# Patient Record
Sex: FEMALE | Race: WHITE
[De-identification: names, ages, dates, MRNs, and addresses within clinical notes are randomized per-mention and may not be internally consistent; named-entity substitution may affect disease eponyms.]

---

## 2019-03-19 ENCOUNTER — HOSPITAL ENCOUNTER (INPATIENT)
Dept: HOSPITAL 91 - OBT | Age: 34
LOS: 2 days | Discharge: HOME | End: 2019-03-21
Payer: MEDICAID

## 2019-03-19 ENCOUNTER — HOSPITAL ENCOUNTER (INPATIENT)
Dept: HOSPITAL 10 - OBT | Age: 34
LOS: 2 days | Discharge: HOME | End: 2019-03-21
Attending: OBSTETRICS & GYNECOLOGY | Admitting: OBSTETRICS & GYNECOLOGY
Payer: MEDICAID

## 2019-03-19 VITALS
BODY MASS INDEX: 28.4 KG/M2 | WEIGHT: 160.28 LBS | BODY MASS INDEX: 28.4 KG/M2 | WEIGHT: 160.28 LBS | HEIGHT: 63 IN | HEIGHT: 63 IN

## 2019-03-19 VITALS — HEART RATE: 74 BPM | RESPIRATION RATE: 18 BRPM | DIASTOLIC BLOOD PRESSURE: 60 MMHG | SYSTOLIC BLOOD PRESSURE: 100 MMHG

## 2019-03-19 VITALS — SYSTOLIC BLOOD PRESSURE: 100 MMHG | RESPIRATION RATE: 16 BRPM | HEART RATE: 85 BPM | DIASTOLIC BLOOD PRESSURE: 62 MMHG

## 2019-03-19 VITALS — SYSTOLIC BLOOD PRESSURE: 104 MMHG | RESPIRATION RATE: 18 BRPM | HEART RATE: 82 BPM | DIASTOLIC BLOOD PRESSURE: 59 MMHG

## 2019-03-19 VITALS — DIASTOLIC BLOOD PRESSURE: 60 MMHG | RESPIRATION RATE: 19 BRPM | HEART RATE: 63 BPM | SYSTOLIC BLOOD PRESSURE: 100 MMHG

## 2019-03-19 DIAGNOSIS — Z3A.39: ICD-10-CM

## 2019-03-19 LAB
ADD MAN DIFF?: NO
BASOPHIL #: 0.1 10^3/UL (ref 0–0.1)
BASOPHILS %: 0.7 % (ref 0–2)
EOSINOPHILS #: 0.1 10^3/UL (ref 0–0.5)
EOSINOPHILS %: 1.4 % (ref 0–7)
HEMATOCRIT: 35.5 % (ref 37–47)
HEMOGLOBIN: 11.8 G/DL (ref 12–16)
HEPATITIS B SURFACE ANTIGEN: NEGATIVE
IMMATURE GRANS #M: 0.15 10^3/UL (ref 0–0.03)
IMMATURE GRANS % (M): 1.5 % (ref 0–0.43)
INR: 0.89
LYMPHOCYTES #: 2.3 10^3/UL (ref 0.8–2.9)
LYMPHOCYTES %: 23.8 % (ref 15–51)
MEAN CORPUSCULAR HEMOGLOBIN: 27.7 PG (ref 29–33)
MEAN CORPUSCULAR HGB CONC: 33.2 G/DL (ref 32–37)
MEAN CORPUSCULAR VOLUME: 83.3 FL (ref 82–101)
MEAN PLATELET VOLUME: 9.5 FL (ref 7.4–10.4)
MONOCYTE #: 1.5 10^3/UL (ref 0.3–0.9)
MONOCYTES %: 14.9 % (ref 0–11)
NEUTROPHIL #: 5.6 10^3/UL (ref 1.6–7.5)
NEUTROPHILS %: 57.7 % (ref 39–77)
NUCLEATED RED BLOOD CELLS #: 0 10^3/UL (ref 0–0)
NUCLEATED RED BLOOD CELLS%: 0 /100WBC (ref 0–0)
PARTIAL THROMBOPLASTIN TIME: 27.2 SEC (ref 23–35)
PLATELET COUNT: 347 10^3/UL (ref 140–415)
PROTIME: 12.2 SEC (ref 11.9–14.9)
PT RATIO: 1
RAPID PLASMA REAGIN: NONREACTIVE
RED BLOOD COUNT: 4.26 10^6/UL (ref 4.2–5.4)
RED CELL DISTRIBUTION WIDTH: 14.8 % (ref 11.5–14.5)
WHITE BLOOD COUNT: 9.8 10^3/UL (ref 4.8–10.8)

## 2019-03-19 PROCEDURE — 86900 BLOOD TYPING SEROLOGIC ABO: CPT

## 2019-03-19 PROCEDURE — 86901 BLOOD TYPING SEROLOGIC RH(D): CPT

## 2019-03-19 PROCEDURE — 3E033VJ INTRODUCTION OF OTHER HORMONE INTO PERIPHERAL VEIN, PERCUTANEOUS APPROACH: ICD-10-PCS | Performed by: OBSTETRICS & GYNECOLOGY

## 2019-03-19 PROCEDURE — 86592 SYPHILIS TEST NON-TREP QUAL: CPT

## 2019-03-19 PROCEDURE — 87340 HEPATITIS B SURFACE AG IA: CPT

## 2019-03-19 PROCEDURE — 86850 RBC ANTIBODY SCREEN: CPT

## 2019-03-19 PROCEDURE — 85730 THROMBOPLASTIN TIME PARTIAL: CPT

## 2019-03-19 PROCEDURE — G0463 HOSPITAL OUTPT CLINIC VISIT: HCPCS

## 2019-03-19 PROCEDURE — 85610 PROTHROMBIN TIME: CPT

## 2019-03-19 PROCEDURE — 90686 IIV4 VACC NO PRSV 0.5 ML IM: CPT

## 2019-03-19 PROCEDURE — 85025 COMPLETE CBC W/AUTO DIFF WBC: CPT

## 2019-03-19 PROCEDURE — 3E033VJ INTRODUCTION OF OTHER HORMONE INTO PERIPHERAL VEIN, PERCUTANEOUS APPROACH: ICD-10-PCS

## 2019-03-19 RX ADMIN — PYRIDOXINE HYDROCHLORIDE SCH MLS/HR: 100 INJECTION, SOLUTION INTRAMUSCULAR; INTRAVENOUS at 05:52

## 2019-03-19 RX ADMIN — Medication 1 APPLIC: at 11:47

## 2019-03-19 RX ADMIN — IBUPROFEN 1 MG: 600 TABLET ORAL at 11:48

## 2019-03-19 RX ADMIN — IBUPROFEN 1 MG: 600 TABLET ORAL at 17:54

## 2019-03-19 RX ADMIN — Medication 1 MLS/HR: at 11:50

## 2019-03-19 RX ADMIN — PYRIDOXINE HYDROCHLORIDE SCH MLS/HR: 100 INJECTION, SOLUTION INTRAMUSCULAR; INTRAVENOUS at 15:30

## 2019-03-19 RX ADMIN — PYRIDOXINE HYDROCHLORIDE SCH MLS/HR: 100 INJECTION, SOLUTION INTRAMUSCULAR; INTRAVENOUS at 21:52

## 2019-03-19 RX ADMIN — Medication SCH MLS/HR: at 05:52

## 2019-03-19 RX ADMIN — Medication 1 MLS/HR: at 05:52

## 2019-03-19 RX ADMIN — IBUPROFEN SCH MG: 600 TABLET ORAL at 11:48

## 2019-03-19 RX ADMIN — IBUPROFEN SCH MG: 600 TABLET ORAL at 06:13

## 2019-03-19 RX ADMIN — Medication SCH MLS/HR: at 11:50

## 2019-03-19 RX ADMIN — Medication PRN APPLIC: at 11:47

## 2019-03-19 RX ADMIN — Medication 1 MLS/HR: at 05:45

## 2019-03-19 RX ADMIN — Medication 1 MLS/HR: at 05:43

## 2019-03-19 RX ADMIN — HYDROCODONE BITARTRATE AND ACETAMINOPHEN 1 TAB: 5; 325 TABLET ORAL at 08:30

## 2019-03-19 RX ADMIN — IBUPROFEN 1 MG: 600 TABLET ORAL at 06:13

## 2019-03-19 RX ADMIN — PYRIDOXINE HYDROCHLORIDE 1 MLS/HR: 100 INJECTION, SOLUTION INTRAMUSCULAR; INTRAVENOUS at 02:47

## 2019-03-19 RX ADMIN — AMPICILLIN 1 MLS/HR: 2 INJECTION, POWDER, FOR SOLUTION INTRAVENOUS at 02:48

## 2019-03-19 RX ADMIN — PYRIDOXINE HYDROCHLORIDE 1 MLS/HR: 100 INJECTION, SOLUTION INTRAMUSCULAR; INTRAVENOUS at 15:30

## 2019-03-19 RX ADMIN — PYRIDOXINE HYDROCHLORIDE 1 MLS/HR: 100 INJECTION, SOLUTION INTRAMUSCULAR; INTRAVENOUS at 05:52

## 2019-03-19 RX ADMIN — PYRIDOXINE HYDROCHLORIDE 1 MLS/HR: 100 INJECTION, SOLUTION INTRAMUSCULAR; INTRAVENOUS at 21:52

## 2019-03-19 RX ADMIN — IBUPROFEN SCH MG: 600 TABLET ORAL at 17:54

## 2019-03-19 NOTE — LDN
Date/Time of Note


Date/Time of Note


DATE: 3/19/19 


TIME: 05:44





Delivery Summary


 of a viable baby boy weighinbg 3715 grams or 8# 3 oz, 19.5" long, and with 


Apgars of 8/9.


Weeks of Gestation


39w 4d


Placenta Delivered:  Spontaneously


Meconium:  none


Episiotomy:  No


Perineal laceration:  0


Estimated blood loss:  200


Sponge & Needle done & correct:  Yes


All needle counts correct:  Yes


Any foreign bodies felt in the:  No (vagina)





Infant Delivery Information


Sex


Infant Sex:  male





Apgars


1 Minute:  8


5 Minute:  9





Suctioning


Nose & mouth suctioned at leandro:  Yes


Delee suction performed:  No





Umbilical Cord


Umbilical cord with:  3 Vessels


Cord presentations:  nuchal cord


Nuchal cord present X:  1


Cord Blood was obtained:  Yes





Mother & Baby Disposition


Disposition


Mom & Baby to Maternity; Good:  Yes


Baby to NICU:  No











JESSICA RICHMOND MD             Mar 19, 2019 05:46

## 2019-03-19 NOTE — HP
Date/Time of Note


Date/Time of Note


DATE: 3/19/19 


TIME: 05:47





OB - History


Hx of Present Pregnancy


Free Text/Dictation


33 y.o.  with an IUP at 39w 3d came in active labor.


Estimated Due Date:  Mar 22, 2019


:  3


Para:  2


Prenatal Care:  Good Care


Ultrasounds:  Normal mid trimester US


Obstetrical Complications:  None


Medical Complications:  None


Other Pregnancy Concerns:


PMHx: none.


PSHx: none.


NKDA





Past Family/Social History


*


Past Medical, Surgical, Family and Obstetric Histories reviewed with pt, not 


from prenatal records as they were not available.


Blood Type:  O+


Rubella:  immune


RPR/VDRL:  Negative


GBS Status:  Negative


HBsAG:  Negative





OB  Admission Exam


Vital Signs


Vital Signs


T=98.2  /70





Physical Exam


HEENT:  WNL


Lungs:  Clear


Abdomen:  WNL


Extremities:  Normal


Reflexes:  Normal


Cervical Dilatation:  3cm


Effacement:  75%


Station:  -3


Membranes:  Intact


Fetal Heart Rate:  130's


Accelerations:  Accelerations Present


Decelerations:  No Decelerations


Varibility:  Moderate


Contractions on Admission:  < 5 Minutes Apart


Intensity:  Moderate


Last 72 hours Lab Results


                                    CBC & BMP


3/19/19 02:30











OB  Assessment/Plan


Reason for admission:  active labor


Plan:  Expectant Management











JESSICA RICHMOND MD             Mar 19, 2019 05:51

## 2019-03-19 NOTE — TRIAGE
===================================

OB Triage

===================================

Datetime Report Generated by CPN: 03/19/2019 03:24

   

   

===========================

Datetime: 03/19/2019 03:00

===========================

   

 Stage of Pregnancy:  Labor

   

===================================

Labor Evaluation

===================================

   

 Frequency:  3-5

 Monitor Mode:  External

 Duration (sec)2399:  

 Quality:  Strong

 Pattern:  Normal: <= 5 Contractions in 10 Minutes

 Resting Tone [place]:  Relaxed

   

===================================

Fetal Heart Rate

===================================

   

 FHR Baseline Rate:  130

 Monitor Mode:  External US

 Variability:  Moderate 6-25 bpm

 Accelerations:  None

 Decelerations:  None

   

===================================

Vaginal Exam

===================================

   

 Dilatation (cms):  8.5

 Effacement (%):  90

 Station:  -2

 Exam By:  JLEAL

 Cervix, Consistency:  Soft

 Cervix, Position:  Anterior

   

===========================

Datetime: 03/19/2019 02:36

===========================

   

 Assessment Type:  Admission Assessment

 Time of Arrival:  03/19/2019 02:10

 EGA:  39.4

 Arrived By:  Wheelchair

 Arrived From:  Home

 Chief Complaint:  uc's since 22:00

 Fetal Movement:  Present

 Contractions:  Regular

 Contractions:  Q5MIN

 Rupture of Membranes:  Denies

 Vaginal Bleeding:  None

 Vaginal Discharge:  Denies

 Recent Sexual Intercouse:  Denies

 Abdominal Trauma:  Not Applicable

 Patient Complaints:  Contractions

 Time Provider Notified:  03/19/2019 02:52

 Provider Notified:  YI

 Initial Plan:  EFM, SVE CALL OB

   

===================================

Maternal Assessment

===================================

   

 Level of Consciousness:  Fully Conscious

 DTR's/Clonus:  DTRs 2+; No Clonus

 Headache:  Denies

 Blurred Vision:  No

 Respiratory Effort:  Unlabored; Regular Rhythm; Equal Expansion

 Breath Sounds, Left:  Clear and Equal

 Breath Sounds, Right:  Clear and Equal

 Nausea/Vomiting:  Denies

 RUQ Epigastric Pain:  Denies

 Lower Extremities Edema:  None

     Degree:  None

 Upper Extremities Edema:  None

     Degree:  None

 Facial Edema:  None

   

===================================

Fall Risk Assessment

===================================

   

 History of Falling:  (0) No

 Secondary Diagnosis:  (0) No

 Ambulatory Aid:  (0) Bedrest/Nurse Assist

 IV Therapy:  (0) No

 Gait:  (0) Normal/Bedrest/Immobile

 Mental Status:  (0) Oriented to Own Ability

 Fall Score:  0

 Fall Risk Score Definition:  No Risk: No action required

   

===================================

Labor Evaluation

===================================

   

 Frequency:  5

 Duration (sec)2399:  80

 Quality:  Strong

 Pattern:  Normal: <= 5 Contractions in 10 Minutes

 Resting Tone [place]:  Relaxed

   

===================================

Fetal Heart Rate

===================================

   

 FHR Baseline Rate:  135

 Variability:  Moderate 6-25 bpm

 Accelerations:  15X15

 Decelerations:  None

 Category:  Category I

   

===================================

Pain Assessment

===================================

   

 Pain Scale:  9

 Pain Presence:  Intermittent

 Pain Type:  Contraction

 Membrane Status:  Intact

   

===========================

Datetime: 03/19/2019 02:34

===========================

   

 Time of Arrival:  03/19/2019 02:34

 EGA:  39.4

 Arrived By:  Wheelchair

   

===========================

Datetime: 03/19/2019 02:32

===========================

   

 Stage of Pregnancy:  Labor

   

===========================

Datetime: 03/19/2019 02:27

===========================

   

   

===================================

Labor Evaluation

===================================

   

 Frequency:  5

 Monitor Mode:  External

 Duration (sec)2399:  70-90

 Pattern:  Normal: <= 5 Contractions in 10 Minutes

   

===================================

Fetal Heart Rate

===================================

   

 FHR Baseline Rate:  130

 Monitor Mode:  External US

 FHR Baseline Changes:  No Baseline Change

 Variability:  Moderate 6-25 bpm

 Accelerations:  None

   

===========================

Datetime: 03/19/2019 02:20

===========================

   

   

===================================

Vaginal Exam

===================================

   

 Dilatation (cms):  7.0

 Effacement (%):  80

 Station:  -2

 Exam By:  GAEL RN

 Membrane Status:  Intact

 Vaginal Bleeding:  None

 Cervix, Consistency:  Soft

 Cervix, Position:  Posterior

 Fetal Presentation 'A':  Cephalic

   

===========================

Datetime: 03/19/2019 02:17

===========================

   

 Stage of Pregnancy:  OB Triage

 Assessment Type:  Triage

   

===================================

Maternal Assessment

===================================

   

 Level of Consciousness:  Fully Conscious

 Headache:  Denies

 Blurred Vision:  No

 Respiratory Effort:  Unlabored; Regular Rhythm; Equal Expansion

 Breath Sounds, Left:  Clear and Equal

 Breath Sounds, Right:  Clear and Equal

 Nausea/Vomiting:  Denies

 RUQ Epigastric Pain:  Denies

 Facial Edema:  None

 Temperature Route:  Oral

   

===================================

Fall Risk Assessment

===================================

   

 History of Falling:  (0) No

 Secondary Diagnosis:  (0) No

 Ambulatory Aid:  (0) Bedrest/Nurse Assist

 IV Therapy:  (0) No

 Gait:  (0) Normal/Bedrest/Immobile

 Mental Status:  (0) Oriented to Own Ability

 Fall Score:  0

 Fall Risk Score Definition:  No Risk: No action required

## 2019-03-20 VITALS — RESPIRATION RATE: 20 BRPM | DIASTOLIC BLOOD PRESSURE: 58 MMHG | SYSTOLIC BLOOD PRESSURE: 101 MMHG | HEART RATE: 85 BPM

## 2019-03-20 VITALS — RESPIRATION RATE: 18 BRPM | HEART RATE: 79 BPM | DIASTOLIC BLOOD PRESSURE: 57 MMHG | SYSTOLIC BLOOD PRESSURE: 100 MMHG

## 2019-03-20 VITALS — DIASTOLIC BLOOD PRESSURE: 66 MMHG | HEART RATE: 81 BPM | SYSTOLIC BLOOD PRESSURE: 96 MMHG | RESPIRATION RATE: 20 BRPM

## 2019-03-20 LAB
ADD MAN DIFF?: NO
BASOPHIL #: 0.1 10^3/UL (ref 0–0.1)
BASOPHILS %: 0.6 % (ref 0–2)
EOSINOPHILS #: 0.1 10^3/UL (ref 0–0.5)
EOSINOPHILS %: 1 % (ref 0–7)
HEMATOCRIT: 36.6 % (ref 37–47)
HEMOGLOBIN: 11.8 G/DL (ref 12–16)
IMMATURE GRANS #M: 0.08 10^3/UL (ref 0–0.03)
IMMATURE GRANS % (M): 0.6 % (ref 0–0.43)
LYMPHOCYTES #: 3.5 10^3/UL (ref 0.8–2.9)
LYMPHOCYTES %: 25.4 % (ref 15–51)
MEAN CORPUSCULAR HEMOGLOBIN: 26.9 PG (ref 29–33)
MEAN CORPUSCULAR HGB CONC: 32.2 G/DL (ref 32–37)
MEAN CORPUSCULAR VOLUME: 83.6 FL (ref 82–101)
MEAN PLATELET VOLUME: 9.6 FL (ref 7.4–10.4)
MONOCYTE #: 1.2 10^3/UL (ref 0.3–0.9)
MONOCYTES %: 8.5 % (ref 0–11)
NEUTROPHIL #: 8.9 10^3/UL (ref 1.6–7.5)
NEUTROPHILS %: 63.9 % (ref 39–77)
NUCLEATED RED BLOOD CELLS #: 0 10^3/UL (ref 0–0)
NUCLEATED RED BLOOD CELLS%: 0 /100WBC (ref 0–0)
PLATELET COUNT: 366 10^3/UL (ref 140–415)
RED BLOOD COUNT: 4.38 10^6/UL (ref 4.2–5.4)
RED CELL DISTRIBUTION WIDTH: 15.2 % (ref 11.5–14.5)
WHITE BLOOD COUNT: 14 10^3/UL (ref 4.8–10.8)

## 2019-03-20 RX ADMIN — PYRIDOXINE HYDROCHLORIDE 1 MLS/HR: 100 INJECTION, SOLUTION INTRAMUSCULAR; INTRAVENOUS at 21:52

## 2019-03-20 RX ADMIN — IBUPROFEN SCH MG: 600 TABLET ORAL at 00:27

## 2019-03-20 RX ADMIN — IBUPROFEN 1 MG: 600 TABLET ORAL at 23:47

## 2019-03-20 RX ADMIN — IBUPROFEN 1 MG: 600 TABLET ORAL at 18:18

## 2019-03-20 RX ADMIN — PYRIDOXINE HYDROCHLORIDE SCH MLS/HR: 100 INJECTION, SOLUTION INTRAMUSCULAR; INTRAVENOUS at 13:52

## 2019-03-20 RX ADMIN — PYRIDOXINE HYDROCHLORIDE SCH MLS/HR: 100 INJECTION, SOLUTION INTRAMUSCULAR; INTRAVENOUS at 21:52

## 2019-03-20 RX ADMIN — IBUPROFEN SCH MG: 600 TABLET ORAL at 05:54

## 2019-03-20 RX ADMIN — PYRIDOXINE HYDROCHLORIDE SCH MLS/HR: 100 INJECTION, SOLUTION INTRAMUSCULAR; INTRAVENOUS at 05:52

## 2019-03-20 RX ADMIN — PYRIDOXINE HYDROCHLORIDE 1 MLS/HR: 100 INJECTION, SOLUTION INTRAMUSCULAR; INTRAVENOUS at 13:52

## 2019-03-20 RX ADMIN — IBUPROFEN SCH MG: 600 TABLET ORAL at 23:47

## 2019-03-20 RX ADMIN — IBUPROFEN SCH MG: 600 TABLET ORAL at 12:20

## 2019-03-20 RX ADMIN — IBUPROFEN 1 MG: 600 TABLET ORAL at 00:27

## 2019-03-20 RX ADMIN — IBUPROFEN SCH MG: 600 TABLET ORAL at 18:18

## 2019-03-20 RX ADMIN — PYRIDOXINE HYDROCHLORIDE 1 MLS/HR: 100 INJECTION, SOLUTION INTRAMUSCULAR; INTRAVENOUS at 05:52

## 2019-03-20 RX ADMIN — INFLUENZA A VIRUS A/MICHIGAN/45/2015 X-275 (H1N1) ANTIGEN (FORMALDEHYDE INACTIVATED), INFLUENZA A VIRUS A/HONG KONG/4801/2014 X-263B (H3N2) ANTIGEN (FORMALDEHYDE INACTIVATED), INFLUENZA B VIRUS B/PHUKET/3073/2013 ANTIGEN (FORMALDEHYDE INACTIVATED), AND INFLUENZA B VIRUS B/BRISBANE/60/2008 ANTIGEN (FORMALDEHYDE INACTIVATED) 1 ML: 15; 15; 15; 15 INJECTION, SUSPENSION INTRAMUSCULAR at 10:00

## 2019-03-20 RX ADMIN — IBUPROFEN 1 MG: 600 TABLET ORAL at 12:20

## 2019-03-20 RX ADMIN — IBUPROFEN 1 MG: 600 TABLET ORAL at 05:54

## 2019-03-20 NOTE — DS
Date/Time of Note


Date/Time of Note


DATE: 3/20/19 


TIME: 21:00





Obstetrical Discharge Record


Final Diagnosis


Final Diagnosis:  Term delivered


Other Final Diagnosis





33-year-old -0-0-3 s/p normal vaginal delivery at 39 weeks and 3 days. 


PPD#1.  Her postpartum course was unremarkable.  She is ambulating and 


tolerating regular rate and regular diet. She is voiding without difficulty.  


The pain is controlled on current medication


-  AF, VSS


-  Contraception methods with R/B/A/FR discussed


-  Discharge home tomorrow


-  Rx and instruction given


-  Follow up in 2 and 6 weeks at clinic





Vaginal Delivery


Obstetrical Delivery:  Spontaneous





Condition on Discharge


Physical Assessment


Voiding:  Yes


Bowel Movement:  Yes


Breast:  Soft, non-tender


Fundus:  Firm


Calf Tenderness:  No


Patient Condition:  Stable











HUBERT BROWNE                  Mar 20, 2019 21:02

## 2019-03-20 NOTE — PN
Date/Time of Note


Date/Time of Note


DATE: 3/20/19 


TIME: 20:58





OB Subjective


Subjective


Subjective


PPD# 1





Patient is doing well. She denies nausea, vomiting, shortness of breath, chest 


pain, headache. She has been ambulating without difficulty, tolerating regular 


diet. Pain is well controlled on current medications





OB Objective


Objective


Objective





Vital Signs


  Date      Temp  Pulse  Resp  B/P (MAP)   Pulse Ox  O2          O2 Flow    FiO2


Time                                                 Delivery    Rate


   3/20/19  98.2     81    20  96/66 (76)            Room Air


     16:14





General: AAO X 3, comfortable, NAD, appropriate mood and a


Laboratory Tests


Test
                       3/19/19
02:30          3/20/19
06:39   3/20/19
08:24


White Blood Count            9.8 10^3/ul                           14.0 10^3/ul


Red Blood Count             4.26 10^6/ul                           4.38 10^6/ul


Hemoglobin                     11.8 g/dl                              11.8 g/dl


Hematocrit                        35.5 %                                 36.6 %


Mean Corpuscular Volume          83.3 fl                                83.6 fl


Mean Corpuscular                 27.7 pg                                26.9 pg


Hemoglobin


Mean Corpuscular              33.2 g/dl 
  
                         32.2 g/dl 



Hemoglobin
Concent


Red Cell Distribution             14.8 %                                 15.2 %


Width


Platelet Count               347 10^3/UL                            366 10^3/UL


Mean Platelet Volume              9.5 fl                                 9.6 fl


Immature Granulocytes %          1.500 %                                0.600 %


Neutrophils %                     57.7 %                                 63.9 %


Lymphocytes %                     23.8 %                                 25.4 %


Monocytes %                       14.9 %                                  8.5 %


Eosinophils %                      1.4 %                                  1.0 %


Basophils %                        0.7 %                                  0.6 %


Nucleated Red Blood Cells    0.0 /100WBC                            0.0 /100WBC


%


Immature Granulocytes #    0.150 10^3/ul                          0.080 10^3/ul


Neutrophils #                5.6 10^3/ul                            8.9 10^3/ul


Lymphocytes #                2.3 10^3/ul                            3.5 10^3/ul


Monocytes #                  1.5 10^3/ul                            1.2 10^3/ul


Eosinophils #                0.1 10^3/ul                            0.1 10^3/ul


Basophils #                  0.1 10^3/ul                            0.1 10^3/ul


Nucleated Red Blood Cells    0.0 10^3/ul                            0.0 10^3/ul


#


Prothrombin Time                12.2 Sec


Prothrombin Time Ratio               1.0


INR International                  0.89 
  
                      



Normalized
Ratio


Activated                      27.2 Sec 
  
                      



Partial
Thromboplast Time


Rapid Plasma Reagin        NONREACTIVE


Hepatitis B Surface        NEGATIVE


Antigen


Lab Scanned Report
        
               REFERENCE LAB
[id number]  



ffect.


ABD: +BS. Soft, non-tender. Uterus 2 cm below umbilicus 


Flank: No CVA tenderness (B/L)


LE: Mild edema. No clubbing, cyanosis, thigh or calf tenderness (B/L). Homans 


'sign is negative





OB  Assessment/Plan


Other plan:





3-year-old -0-0-3 s/p normal vaginal delivery at 39 weeks and 3 days. PPD#1


-  AF, VSS


-  Contraception methods with R/B/A/FR discussed


-  Discharge home tomorrow


-  Rx and instruction given


-  Follow up in 2 and 6 weeks at clinic











HUBERT BROWNE                  Mar 20, 2019 20:59

## 2019-03-21 VITALS — DIASTOLIC BLOOD PRESSURE: 62 MMHG | RESPIRATION RATE: 20 BRPM | SYSTOLIC BLOOD PRESSURE: 109 MMHG | HEART RATE: 68 BPM

## 2019-03-21 VITALS — DIASTOLIC BLOOD PRESSURE: 62 MMHG | HEART RATE: 64 BPM | RESPIRATION RATE: 18 BRPM | SYSTOLIC BLOOD PRESSURE: 99 MMHG

## 2019-03-21 VITALS — RESPIRATION RATE: 20 BRPM | DIASTOLIC BLOOD PRESSURE: 53 MMHG | HEART RATE: 77 BPM | SYSTOLIC BLOOD PRESSURE: 90 MMHG

## 2019-03-21 RX ADMIN — IBUPROFEN 1 MG: 600 TABLET ORAL at 12:56

## 2019-03-21 RX ADMIN — PYRIDOXINE HYDROCHLORIDE SCH MLS/HR: 100 INJECTION, SOLUTION INTRAMUSCULAR; INTRAVENOUS at 05:52

## 2019-03-21 RX ADMIN — Medication PRN APPLIC: at 16:50

## 2019-03-21 RX ADMIN — IBUPROFEN SCH MG: 600 TABLET ORAL at 12:56

## 2019-03-21 RX ADMIN — CLOSTRIDIUM TETANI TOXOID ANTIGEN (FORMALDEHYDE INACTIVATED), CORYNEBACTERIUM DIPHTHERIAE TOXOID ANTIGEN (FORMALDEHYDE INACTIVATED), BORDETELLA PERTUSSIS TOXOID ANTIGEN (GLUTARALDEHYDE INACTIVATED), BORDETELLA PERTUSSIS FILAMENTOUS HEMAGGLUTININ ANTIGEN (FORMALDEHYDE INACTIVATED), BORDETELLA PERTUSSIS PERTACTIN ANTIGEN, AND BORDETELLA PERTUSSIS FIMBRIAE 2/3 ANTIGEN 1 ML: 5; 2; 2.5; 5; 3; 5 INJECTION, SUSPENSION INTRAMUSCULAR at 09:00

## 2019-03-21 RX ADMIN — IBUPROFEN 1 MG: 600 TABLET ORAL at 05:47

## 2019-03-21 RX ADMIN — PYRIDOXINE HYDROCHLORIDE SCH MLS/HR: 100 INJECTION, SOLUTION INTRAMUSCULAR; INTRAVENOUS at 13:52

## 2019-03-21 RX ADMIN — Medication 1 APPLIC: at 16:50

## 2019-03-21 RX ADMIN — IBUPROFEN SCH MG: 600 TABLET ORAL at 05:47

## 2019-03-21 RX ADMIN — PYRIDOXINE HYDROCHLORIDE 1 MLS/HR: 100 INJECTION, SOLUTION INTRAMUSCULAR; INTRAVENOUS at 13:52

## 2019-03-21 RX ADMIN — PYRIDOXINE HYDROCHLORIDE 1 MLS/HR: 100 INJECTION, SOLUTION INTRAMUSCULAR; INTRAVENOUS at 05:52

## 2019-03-25 NOTE — DELSUM
===================================

Delivery Summary A-C

===================================

Datetime Report Generated by CPN: 2019 11:20

   

   

===================================

DELIVERY PERSONNEL

===================================

   

Circulator:  Marino, Sade

   

===================================

MATERNAL INFORMATION

===================================

   

Delivery Anesthesia:  None

Medications in Delivery:  30 UNITS PITOCIN

Delivery QBL (ml):  200

Placenta Cultured:  No

Maternal Complications:  None

   

===================================

LABOR SUMMARY

===================================

   

EDC:  2019 00:00

No. Babies in Womb:  1

 Attempted:  No

Labor Anesthesia:  None

   

===================================

LABOR INFORMATION

===================================

   

Reason for Induction:  Not Applicable

Onset of Labor:  2019 22:00

Complete Dilatation:  2019 05:11

Oxytocin:  N/A

Group B Beta Strep:  Negative

Antibiotics # of Doses:  1

Antibiotics Time of Last Dose:  2019 02:48

Steroids Given:  None

Reason Steroids Not Administered:  Not Applicable

   

===================================

MEMBRANES

===================================

   

Membranes Rupture Method:  Spontaneous

Rupture of Membranes:  2019 05:10

Length of Rupture (hr):  0.32

Amniotic Fluid Color:  Clear

Amniotic Fluid Amount:  Small

Amniotic Fluid Odor:  None

   

===================================

STAGES OF LABOR

===================================

   

Stage 1 hr:  -16

Stage 1 min:  -49

Stage 2 hr:  0

Stage 2 min:  18

Stage 3 hr:  0

Stage 3 min:  4

Total Time in Labor hr:  -16

Total Time in Labor min:  -27

   

===================================

VAGINAL DELIVERY

===================================

   

Episiotomy:  None

Laceration Extension:  N/A

Laceration Type:  None

Initial Vag Sponge Count:  10

Final Vag Sponge Count:  10

Initial Vag Sharps Count:  1

Final Vag Sharps Count:  1

Sponge Count Correct:  Yes; Vaginal Sweep Performed

Sharps Count Correct:  Yes

   

===================================

BABY A INFORMATION

===================================

   

Infant Delivery Date/Time:  2019 05:29

Method of Delivery:  Vaginal

Born in Route :  No

:  N/A

Forceps:  N/A

Vacuum Extraction:  N/A

Shoulder Dystocia :  N/A

   

===================================

SHOULDER DYSTOCIA BABY A

===================================

   

Infant Delivery Date/Time:  2019 05:29

   

===================================

PRESENTATION/POSITION BABY A

===================================

   

Presentation:  Cephalic

Cephalic Presentation:  Vertex

Breech Presentation:  N/A

   

===================================

PLACENTA INFORMATION BABY A

===================================

   

Placenta Delivery Time :  2019 05:33

Placenta Method of Delivery:  Expressed

Placenta Status:  Delivered

   

===================================

APGAR SCORES BABY A

===================================

   

Heart Rate 1 min:  >100 bpm

Resp Effort 1 min:  Good Cry

Reflex Irritability 1 min:  Cough/Sneeze/Pulls Away

Muscle Tone 1 min:  Active Motion

Color 1 min:  Blue/Pale

Resuscitation Effort 1 min:  Tactile Stimulation

APGAR SCORE 1 MIN:  8

Heart Rate 5 min:  >100 bpm

Resp Effort 5 min:  Good Cry

Reflex Irritability 5 min:  Cough/Sneeze/Pulls Away

Muscle Tone 5 min:  Active Motion

Color 5 min:  Body Pink, Extremit Blue

Resuscitation Effort 5 min:  Tactile Stimulation

APGAR SCORE 5 MIN:  9

   

===================================

INFANT INFORMATION BABY A

===================================

   

Gestational Age at Delivery:  39.4

Gestational Status:  Full Term- 39- 40.6 Weeks

Infant Outcome :  Liveborn

Infant Condition :  Stable

Infant Sex:  Male

   

===================================

IDENTIFICATION/MEDS BABY A

===================================

   

ID Band Number:  [id number]

ID Band Location:  Right Leg; Left Leg



Sensor Applied:  Yes

Sensor Number:  [id number]

Sensor Location :  Cord Clamp

Vitamin K Given :  Not Given

Erythromycin Given:  Not Given

   

===================================

WEIGHT/LENGTH BABY A

===================================

   

Infant Birthweight (gm):  3715

Infant Weight (lb):  8

Infant Weight (oz):  3

Infant Length (in):  19.50

Infant Length (cm):  49.53

   

===================================

CORD INFORMATION BABY A

===================================

   

No. Cord Vessels:  3

Nuchal Cord :  Around Neck x1, Loose

Cord Blood Taken:  Yes

Infant Suction:  Mouth; Nose

   

===================================

ASSESSMENT BABY A

===================================

   

Infant Complications:  None

Physical Findings at Delivery:  Within Normal Limits

Infant Respirations:  Appears Normal

Neonatologist/ALS Called :  No

Infant Care By:  HERMAN WASHINGTON

Transferred To:  Remains with Mother